# Patient Record
Sex: MALE | Race: WHITE | NOT HISPANIC OR LATINO
[De-identification: names, ages, dates, MRNs, and addresses within clinical notes are randomized per-mention and may not be internally consistent; named-entity substitution may affect disease eponyms.]

---

## 2023-03-22 ENCOUNTER — NON-APPOINTMENT (OUTPATIENT)
Age: 73
End: 2023-03-22

## 2023-03-22 ENCOUNTER — APPOINTMENT (OUTPATIENT)
Dept: OPHTHALMOLOGY | Facility: CLINIC | Age: 73
End: 2023-03-22
Payer: MEDICARE

## 2023-03-22 PROCEDURE — 92004 COMPRE OPH EXAM NEW PT 1/>: CPT

## 2023-03-22 PROCEDURE — 92134 CPTRZ OPH DX IMG PST SGM RTA: CPT

## 2024-03-27 ENCOUNTER — APPOINTMENT (OUTPATIENT)
Dept: OPHTHALMOLOGY | Facility: CLINIC | Age: 74
End: 2024-03-27

## 2024-05-16 PROBLEM — Z00.00 ENCOUNTER FOR PREVENTIVE HEALTH EXAMINATION: Status: ACTIVE | Noted: 2024-05-16

## 2024-05-20 ENCOUNTER — APPOINTMENT (OUTPATIENT)
Dept: ORTHOPEDIC SURGERY | Facility: CLINIC | Age: 74
End: 2024-05-20
Payer: MEDICARE

## 2024-05-20 DIAGNOSIS — E11.9 TYPE 2 DIABETES MELLITUS W/OUT COMPLICATIONS: ICD-10-CM

## 2024-05-20 DIAGNOSIS — Z78.9 OTHER SPECIFIED HEALTH STATUS: ICD-10-CM

## 2024-05-20 DIAGNOSIS — E78.00 PURE HYPERCHOLESTEROLEMIA, UNSPECIFIED: ICD-10-CM

## 2024-05-20 DIAGNOSIS — M75.111 INCOMPLETE ROTATOR CUFF TEAR OR RUPTURE OF RIGHT SHOULDER, NOT SPECIFIED AS TRAUMATIC: ICD-10-CM

## 2024-05-20 PROCEDURE — 20610 DRAIN/INJ JOINT/BURSA W/O US: CPT | Mod: RT

## 2024-05-20 PROCEDURE — 73030 X-RAY EXAM OF SHOULDER: CPT | Mod: RT

## 2024-05-20 PROCEDURE — 99204 OFFICE O/P NEW MOD 45 MIN: CPT | Mod: 25

## 2024-05-20 RX ORDER — METFORMIN HYDROCHLORIDE 500 MG/1
500 TABLET, COATED ORAL
Refills: 0 | Status: ACTIVE | COMMUNITY

## 2024-05-20 RX ORDER — TAMSULOSIN HYDROCHLORIDE 0.4 MG/1
0.4 CAPSULE ORAL
Refills: 0 | Status: ACTIVE | COMMUNITY

## 2024-05-20 RX ORDER — EMPAGLIFLOZIN 25 MG/1
25 TABLET, FILM COATED ORAL
Refills: 0 | Status: ACTIVE | COMMUNITY

## 2024-05-20 RX ORDER — LOSARTAN POTASSIUM 50 MG/1
50 TABLET, FILM COATED ORAL
Refills: 0 | Status: ACTIVE | COMMUNITY

## 2024-05-20 RX ORDER — ISOSORBIDE MONONITRATE 60 MG/1
60 TABLET, EXTENDED RELEASE ORAL
Refills: 0 | Status: ACTIVE | COMMUNITY

## 2024-05-20 RX ORDER — ATORVASTATIN CALCIUM 20 MG/1
20 TABLET, FILM COATED ORAL
Refills: 0 | Status: ACTIVE | COMMUNITY

## 2024-05-20 RX ORDER — ASPIRIN 81 MG
81 TABLET, DELAYED RELEASE (ENTERIC COATED) ORAL
Refills: 0 | Status: ACTIVE | COMMUNITY

## 2024-05-20 NOTE — HISTORY OF PRESENT ILLNESS
[de-identified] : Date of initial evaluation is 05/20/2024 Patient age is 74 year  Occupation is retired Body part causing symptoms is the right shoulder Symptoms began NKI 1 month ago Location of pain is lateral Quality of pain is sharp Pain score at rest is 7/10 Pain score during activity is 9/10 Radicular symptoms are not present Prior treatments include Tylenol, PT for 1 month Patient reports pain is improved with lifting his arm over his head Patient's condition is not associated with workers compensation, no-fault or interscholastic athletics

## 2024-05-20 NOTE — DISCUSSION/SUMMARY
[de-identified] : History, clinical examination and imaging were most consistent with: -right rotator cuff partial tear -cervical degenerative disk disease   The diagnosis was explained in detail. The potential non-surgical and surgical treatments were reviewed. The relative risks and benefits of each option were considered relative to the patients age, activity level, medical history, symptom severity and previously attempted treatments.   The patient was advised to consult with their primary medical provider prior to initiation of any new medications to reduce the risk of adverse effects specific to their long-term home medications and medical history. The risk of gastrointestinal irritation and kidney injury specific to long-term NSAID use was discussed.   -Discussed overlap of shoulder and cervical symptoms, shoulder appears primary at this time.  -Patient will proceed with formal PT. -Cortisone injection performed today for symptom relief. -Over the counter NSAID as needed for pain. -Flexeril as needed for spasms. -MRI is discussed and deferred at this time. -Follow up in 8 weeks. If symptoms persist consider shoulder MRI versus cervical workup.   (MDM)   Problem Complexity -Moderate: chronic illness with exacerbation   Risk -Moderate: injection   -Patient has not been seen by another provider in my practice within the past 2 years who specializes in orthopedic surgery.   (Procedure: Corticosteroid Injection)   Injection location was the: -right subacromial burs   Injection contents were: 40 mg of kenalog and 5 mL of 1% lidocaine   Procedure Details: -Informed consent was obtained. Discussed possible risks of corticosteroid injection including hyperglycemia, infection and skin discoloration. -Discussed that due to infection risk, an interval between injection and any future surgery is 6 weeks for arthroscopy and 3 months for arthroplasty. -Injection performed using aseptic technique. Hemostasis was confirmed. -Procedure was tolerated well with no complications.

## 2024-05-20 NOTE — IMAGING
[de-identified] : (Exam: Shoulder)   Laterality is right    Patient is in no acute distress, alert and oriented Sensation is grossly intact to light touch in the hand Motor function is 5/5 in the hand Capillary refill is less than 2 seconds in the fingers Lymphadenopathy is not present Peripheral edema is not present   Skin is intact Swelling is not present Atrophy is not present Scapular winging is not present Deformity of the AC joint is not present Deformity of the biceps is not present   Bicipital groove tenderness is present AC joint tenderness is not present Scapulothoracic tenderness is not present   Active forward elevation is 160 Passive forward elevation is 175 External rotation at the side is 60 Internal rotation behind the back is to the level of T12   Forward elevation strength is 5-/5 External rotation strength at the side is 5/5 Internal rotation strength at the side is 5/5 Deltoid strength of anterior, posterior and lateral heads is 5/5   Benavides test is abnormal OBriens test is abnormal Empty can test is abnormal Speeds test is abnormal Cross body adduction test is normal Belly press test is normal Apprehension and relocation is normal Sulcus sign is normal   (Exam: Cervical Spine)   Skin is intact Swelling is not present Atrophy is not present   Bony tenderness is not present Paraspinal tenderness is present Bony stepoff is not present   Range of motion is limited   Shoulder abduction strength is 5/5 Elbow flexion strength is 5/5 Elbow extension strength is 5/5 Wrist flexion strength is 5/5 Wrist extension strength is 5/5 Finger abduction strength is 5/5   C5-T1 sensation is intact  Biceps, triceps and patellar reflexes are 2+ and symmetric  Clonus is not present Reynoso's sign is not present Spurling's test is normal  [Right] : right shoulder [FreeTextEntry1] : subtle superior humeral head migration, no fracture

## 2024-07-08 ENCOUNTER — APPOINTMENT (OUTPATIENT)
Dept: ORTHOPEDIC SURGERY | Facility: CLINIC | Age: 74
End: 2024-07-08
Payer: MEDICARE

## 2024-07-08 DIAGNOSIS — M75.111 INCOMPLETE ROTATOR CUFF TEAR OR RUPTURE OF RIGHT SHOULDER, NOT SPECIFIED AS TRAUMATIC: ICD-10-CM

## 2024-07-08 PROCEDURE — 99213 OFFICE O/P EST LOW 20 MIN: CPT

## 2025-04-11 ENCOUNTER — APPOINTMENT (OUTPATIENT)
Dept: ORTHOPEDIC SURGERY | Facility: CLINIC | Age: 75
End: 2025-04-11
Payer: MEDICARE

## 2025-04-11 DIAGNOSIS — M75.111 INCOMPLETE ROTATOR CUFF TEAR OR RUPTURE OF RIGHT SHOULDER, NOT SPECIFIED AS TRAUMATIC: ICD-10-CM

## 2025-04-11 PROCEDURE — 20610 DRAIN/INJ JOINT/BURSA W/O US: CPT | Mod: RT

## 2025-04-11 PROCEDURE — 99214 OFFICE O/P EST MOD 30 MIN: CPT | Mod: 25

## 2025-05-23 ENCOUNTER — APPOINTMENT (OUTPATIENT)
Dept: ORTHOPEDIC SURGERY | Facility: CLINIC | Age: 75
End: 2025-05-23
Payer: MEDICARE

## 2025-05-23 DIAGNOSIS — M75.111 INCOMPLETE ROTATOR CUFF TEAR OR RUPTURE OF RIGHT SHOULDER, NOT SPECIFIED AS TRAUMATIC: ICD-10-CM

## 2025-05-23 PROCEDURE — 99213 OFFICE O/P EST LOW 20 MIN: CPT

## 2025-08-22 ENCOUNTER — APPOINTMENT (OUTPATIENT)
Dept: ORTHOPEDIC SURGERY | Facility: CLINIC | Age: 75
End: 2025-08-22
Payer: MEDICARE

## 2025-08-22 ENCOUNTER — RESULT REVIEW (OUTPATIENT)
Age: 75
End: 2025-08-22

## 2025-08-22 PROCEDURE — 99213 OFFICE O/P EST LOW 20 MIN: CPT

## 2025-08-22 RX ORDER — DULOXETINE 40 MG/1
CAPSULE, DELAYED RELEASE ORAL
Refills: 0 | Status: ACTIVE | COMMUNITY

## 2025-08-29 ENCOUNTER — APPOINTMENT (OUTPATIENT)
Dept: ORTHOPEDIC SURGERY | Facility: CLINIC | Age: 75
End: 2025-08-29
Payer: MEDICARE

## 2025-08-29 DIAGNOSIS — M75.111 INCOMPLETE ROTATOR CUFF TEAR OR RUPTURE OF RIGHT SHOULDER, NOT SPECIFIED AS TRAUMATIC: ICD-10-CM

## 2025-08-29 PROCEDURE — 99214 OFFICE O/P EST MOD 30 MIN: CPT | Mod: 25

## 2025-08-29 PROCEDURE — 20610 DRAIN/INJ JOINT/BURSA W/O US: CPT | Mod: RT
